# Patient Record
Sex: MALE | Race: AMERICAN INDIAN OR ALASKA NATIVE
[De-identification: names, ages, dates, MRNs, and addresses within clinical notes are randomized per-mention and may not be internally consistent; named-entity substitution may affect disease eponyms.]

---

## 2018-07-26 ENCOUNTER — HOSPITAL ENCOUNTER (EMERGENCY)
Dept: HOSPITAL 5 - ED | Age: 53
Discharge: HOME | End: 2018-07-26
Payer: SELF-PAY

## 2018-07-26 VITALS — SYSTOLIC BLOOD PRESSURE: 148 MMHG | DIASTOLIC BLOOD PRESSURE: 82 MMHG

## 2018-07-26 DIAGNOSIS — I10: ICD-10-CM
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
## 2018-07-26 DIAGNOSIS — S63.501A: Primary | ICD-10-CM
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
## 2018-07-26 DIAGNOSIS — F17.200: ICD-10-CM

## 2018-07-26 PROCEDURE — 99283 EMERGENCY DEPT VISIT LOW MDM: CPT

## 2018-07-26 NOTE — XRAY REPORT
RIGHT WRIST, 3 VIEWS:



History: Fracture.



Normal bone mineralization no evidence for acute fracture or 

malalignment. Chronic healed fracture of the fifth metacarpal neck is 

suspected. Mild to moderate osteoarthritic changes are identified 

between the carpal bones. The distal radius and ulna are intact.



A punctate radiopaque foreign body is suspected in the soft tissues 

lateral to the first metatarsophalangeal joint. Please correlate with 

the image.



IMPRESSION:

No acute fracture is identified.

Chronic right fifth metacarpal fracture.

Degenerative changes.

Soft tissue foreign body as described.

## 2018-07-26 NOTE — EMERGENCY DEPARTMENT REPORT
Upper Extremity





- HPI


Chief Complaint: Extremity Injury, Upper


Stated Complaint: WRIST PAIN


Time Seen by Provider: 18 10:12


Upper Extremity: Right Wrist (pain after injury)


Occurred When: Today


Mechanism: Twist


Severity: severe


Symptoms: Yes Pain with Movement (right wrist 10/10), Yes Limited Range of 

Movement ( pain with movement), No Deformity, No Numbness, No Weakness, No 

Swelling, No Bruising/Ecchymosis, No Laceration or Abrasion


Other History: This is a 53-year-old patient here complaining of  wrist pain 

after he injured his right wrist while playing with his nephew.  He states that 

this pain started about a 10 and achy, throbbing.  He is taking medication 

without any relief.  Denies any numbness or tingling in.  Denies any difficulty 

or restriction in range of motion.  He just reported that he has pain when he 

moves his right wrist.  No alleviating factors but exacerbated by range of 

motion.





ED Review of Systems


ROS: 


Stated complaint: WRIST PAIN


Other details as noted in HPI





Constitutional: denies: chills, fever


Respiratory: denies: cough, shortness of breath, SOB with exertion, SOB at rest

, stridor, wheezing


Cardiovascular: denies: chest pain, palpitations, dyspnea on exertion, edema, 

syncope, paroxysmal nocturnal dyspnea


Gastrointestinal: denies: abdominal pain, nausea, vomiting, diarrhea


Musculoskeletal: arthralgia.  denies: back pain, joint swelling, myalgia


Skin: denies: rash, lesions


Neurological: denies: headache, numbness, paresthesias





ED Past Medical Hx





- Past Medical History


Previous Medical History?: Yes


Hx Hypertension: Yes





- Surgical History


Past Surgical History?: Yes





- Family History


Family history: hypertension





- Social History


Smoking Status: Current Every Day Smoker


Substance Use Type: None





- Medications


Home Medications: 


 Home Medications











 Medication  Instructions  Recorded  Confirmed  Last Taken  Type


 


Cyclobenzaprine [Flexeril] 10 mg PO TID PRN #12 tablet 18  Unknown Rx


 


Ibuprofen [Motrin 600 MG tab] 600 mg PO Q8H PRN #15 tablet 18  Unknown Rx














Upper Extremity Exam





- Exam


General: 


Vital signs noted. No distress. Alert and acting appropriately.


This is a 53-year-old male well-nourished well-developed in no acute distress.


Head and Torso: No HEENT Abnormality, No Neck Tenderness, No Chest/Lungs 

Abnormality, No Abdominal Tenderness, No Back Tenderness


Shoulder Exam: Yes Normal Range of Motion in Shoulder, No Shoulder Tenderness, 

No Clavicle Tenderness, No Shoulder Deformity, No AC Joint Tenderness


Arm Exam: No Arm/Humerus Tenderness, No Arm Deformity


Elbow: Yes Normal Range of Motion in Elbow, No Elbow Tenderness, No Elbow 

Deformity


Forearm: No Forearm Tenderness, No Forearm Deformity, No Pain with Pronation, 

No Pain with Supination


Wrist: Yes Wrist Tenderness (right wrist), Yes Normal ROM in Wrist (full range 

of motion to bilateral wrists but he has pain with flexion and extension of her 

right wrist.  Tender to palpate right wrist), No Wrist Deformity, No Snuffbox 

Tenderness, No Pain with Axial Thumb Compression


Hand: Yes Normal ROM in Digit(s), No Hand Tenderness, No Hand Deformity, No 

Digit Tenderness, No Digit(s) Deformity, No Tendon Dysfunction


CMS Exam: Yes Normal Distal Pulses, Yes Normal Capillary Refill, Yes Normal 

Distal Sensation, No Broken Skin





ED Course


 Vital Signs











  18





  09:40


 


Temperature 97.8 F


 


Pulse Rate 72


 


Respiratory 20





Rate 


 


Blood Pressure 183/113


 


O2 Sat by Pulse 97





Oximetry 








 Vital Signs











  18





  09:40 10:43


 


Temperature 97.8 F 


 


Pulse Rate 72 


 


Respiratory 20 





Rate  


 


Blood Pressure 183/113 


 


Blood Pressure  148/82





[Right]  


 


O2 Sat by Pulse 97 





Oximetry  














- Reevaluation(s)


Reevaluation #1: 





18 10:46


Patient given hydrocodone 7.5/325 mg 1 tablet and Motrin 800 mg one tablet by 

mouth emergency room.  See procedure note for wrist splinted





- Orthopedic Splinting/Casting


  ** Injury #1


Side: right


Upper Extremity Injury Location: wrist


Upper Extremity Immobilizer: wrist splint


Additional Comments: 





Patient good color, movement, sensation and temperature to fingers of both 

hands.





ED Medical Decision Making





- Radiology Data


Radiology results: report reviewed





X-ray 3 view right wrist dictated by radiologist and report reviewed by myself.

  No acute fracture or dislocation seen.  See report below for details





Patient: IGNACIO LOMAS MR#: [id number] 


: 1965 Acct:[id number] 


Age/Sex: 53 / M ADM Date: 18 


Loc: ED 


Attending Dr: 








Ordering Physician: SHAKIRA YAO MD 


Date of Service: 18 


Procedure(s): XR wrist 3+V RT 


Accession Number(s): [id number] 





cc: SHAKIRA YAO MD 





Fluoro Time In Minutes: 





RIGHT WRIST, 3 VIEWS: 





History: Fracture. 





Normal bone mineralization no evidence for acute fracture or 


malalignment. Chronic healed fracture of the fifth metacarpal neck is 


suspected. Mild to moderate osteoarthritic changes are identified 


between the carpal bones. The distal radius and ulna are intact. 





A punctate radiopaque foreign body is suspected in the soft tissues 


lateral to the first metatarsophalangeal joint. Please correlate with 


the image. 





IMPRESSION: 


No acute fracture is identified. 


Chronic right fifth metacarpal fracture. 


Degenerative changes. 


Soft tissue foreign body as described. 





Transcribed By: TTR 


Dictated By: ANIYA BRIGHT JR, MD 


Electronically Authenticated By: ANIYA BRIGHT JR, MD 


Signed Date/Time: 18 1013 











DD/DT: 18 1010 


TD/TT: 18 1013





- Medical Decision Making





Is a 53-year-old male patient here reports that he thinks he fractured his 

right wrist while playing with his nephew.





Patient was examined and he has pain with flexion and extension of his right 

wrist with tenderness to palpate.  X-ray of right wrist reviewed dictated by 

radiologist and report reviewed by myself and no acute findings.  There is fine 

in for proximal metacarpal fracture which is chronic and radiopaque foreign 

body noted which is also subacute.  Patient had no penetrating injury.  He has 

had previously injured her for aspiration.  He was given pain medication which 

relieved this pain.  Please see procedure note for splinting.  I discussed 

diagnosis and treatment plan the patient along with x-ray findings that he 

voiced understanding.








 arthralgia right wrist, wrist sprain-he was given Norco 7.5/325 mg 1 tablet 

and Motrin 800 mg by mouth which relieved this pain.  Patient will be 

discharged home in Motrin.  Also with right wrist splint that was placed.  

Patient will also be referred to orthopedic doctor for further evaluation and 

treatment of right wrist pain.





Pt discharged home in stable condition with prescription for Motrin and to 

follow-up with orthopedic doctor.  Vital signs are stable and he is afebrile.  

Teaching given on wrist splints and Rice therapy and he voiced understanding.





- Differential Diagnosis


fracture, dislocation, sprain, strain, MSK pain


Critical care attestation.: 


If time is entered above; I have spent that time in minutes in the direct care 

of this critically ill patient, excluding procedure time.








ED Disposition


Clinical Impression: 


 Right wrist pain





Sprain of wrist, right


Qualifiers:


 Encounter type: initial encounter Qualified Code(s): S63.501A - Unspecified 

sprain of right wrist, initial encounter





Disposition:  TO HOME OR SELFCARE


Is pt being admited?: No


Does the pt Need Aspirin: No


Condition: Stable


Instructions:  Wrist Injury (ED), Arthralgia (ED), Wrist Sprain (ED)


Additional Instructions: 


Please follow up with a primary care doctor and also orthopedic doctor in 2-3 

days


See Discharge instruction in Rice therapy


Take Motrin as prescribed but make sure he taken with food and take Flexeril as 

prescribed for muscle spasm but please do not drive or operate heavy machinery 

while taking this medication as it causes drowsiness


If you condition worsens, return to the emergency room.


Referrals: 


QUITA MCCONNELL MD [Staff Physician] - 18


Forms:  Work/School Release Form(ED)

## 2021-12-22 ENCOUNTER — HOSPITAL ENCOUNTER (EMERGENCY)
Dept: HOSPITAL 5 - ED | Age: 56
Discharge: HOME | End: 2021-12-22
Payer: MEDICAID

## 2021-12-22 VITALS — SYSTOLIC BLOOD PRESSURE: 170 MMHG | DIASTOLIC BLOOD PRESSURE: 114 MMHG

## 2021-12-22 DIAGNOSIS — I16.0: Primary | ICD-10-CM

## 2021-12-22 DIAGNOSIS — F17.200: ICD-10-CM

## 2021-12-22 DIAGNOSIS — G89.29: ICD-10-CM

## 2021-12-22 DIAGNOSIS — Z72.89: ICD-10-CM

## 2021-12-22 DIAGNOSIS — Z79.899: ICD-10-CM

## 2021-12-22 DIAGNOSIS — M17.0: ICD-10-CM

## 2021-12-22 LAB
ALBUMIN SERPL-MCNC: 4.4 G/DL (ref 3.9–5)
ALT SERPL-CCNC: 26 UNITS/L (ref 7–56)
BASOPHILS # (AUTO): 0.1 K/MM3 (ref 0–0.1)
BASOPHILS NFR BLD AUTO: 0.5 % (ref 0–1.8)
BUN SERPL-MCNC: 8 MG/DL (ref 9–20)
BUN/CREAT SERPL: 10 %
CALCIUM SERPL-MCNC: 9.6 MG/DL (ref 8.4–10.2)
EOSINOPHIL # BLD AUTO: 0.1 K/MM3 (ref 0–0.4)
EOSINOPHIL NFR BLD AUTO: 1 % (ref 0–4.3)
HCT VFR BLD CALC: 44.6 % (ref 35.5–45.6)
HEMOLYSIS INDEX: 4
HGB BLD-MCNC: 14.4 GM/DL (ref 11.8–15.2)
LYMPHOCYTES # BLD AUTO: 1.8 K/MM3 (ref 1.2–5.4)
LYMPHOCYTES NFR BLD AUTO: 15.3 % (ref 13.4–35)
MCHC RBC AUTO-ENTMCNC: 32 % (ref 32–34)
MCV RBC AUTO: 85 FL (ref 84–94)
MONOCYTES # (AUTO): 0.8 K/MM3 (ref 0–0.8)
MONOCYTES % (AUTO): 7.2 % (ref 0–7.3)
PLATELET # BLD: 319 K/MM3 (ref 140–440)
RBC # BLD AUTO: 5.23 M/MM3 (ref 3.65–5.03)

## 2021-12-22 PROCEDURE — 93005 ELECTROCARDIOGRAM TRACING: CPT

## 2021-12-22 PROCEDURE — 99284 EMERGENCY DEPT VISIT MOD MDM: CPT

## 2021-12-22 PROCEDURE — 84484 ASSAY OF TROPONIN QUANT: CPT

## 2021-12-22 PROCEDURE — 83880 ASSAY OF NATRIURETIC PEPTIDE: CPT

## 2021-12-22 PROCEDURE — 80053 COMPREHEN METABOLIC PANEL: CPT

## 2021-12-22 PROCEDURE — 36415 COLL VENOUS BLD VENIPUNCTURE: CPT

## 2021-12-22 PROCEDURE — 71045 X-RAY EXAM CHEST 1 VIEW: CPT

## 2021-12-22 PROCEDURE — 85025 COMPLETE CBC W/AUTO DIFF WBC: CPT

## 2021-12-22 NOTE — ELECTROCARDIOGRAPH REPORT
[facility]

                                       

Test Date:    2021               Test Time:    12:18:07

Pat Name:     IGNACIO LOMAS           Department:   

Patient ID:   [id number]          Room:          

Gender:       M                        Technician:   

:          1965               Requested By: MACI YAO

Order Number: [id number]              Reading MD:   Laura Leyva

                                 Measurements

Intervals                              Axis          

Rate:         90                       P:            40

AK:           162                      QRS:          4

QRSD:         93                       T:            27

QT:           366                                    

QTc:          448                                    

                           Interpretive Statements

Sinus rhythm

Probable left ventricular hypertrophy

No previous ECG available for comparison

Electronically Signed On 2021 18:00:41 EST by Laura Leyva

## 2021-12-22 NOTE — XRAY REPORT
CHEST 1 VIEW 12/22/2021 10:03 AM



INDICATION / CLINICAL INFORMATION: Chest Pain.



COMPARISON: None available.



FINDINGS:



SUPPORT DEVICES: None.

HEART / MEDIASTINUM: The heart size and pulmonary vasculature are normal for technique. The aorta is 
normal in caliber. 

LUNGS / PLEURA: No significant pulmonary or pleural abnormality. No pneumothorax. 



ADDITIONAL FINDINGS: There is a metallic density overlying the left clavicle.



IMPRESSION: No acute findings.



Signer Name: Himanshu No MD 

Signed: 12/22/2021 11:07 AM

Workstation Name: [id number]
